# Patient Record
Sex: MALE | Race: WHITE | Employment: FULL TIME | URBAN - METROPOLITAN AREA
[De-identification: names, ages, dates, MRNs, and addresses within clinical notes are randomized per-mention and may not be internally consistent; named-entity substitution may affect disease eponyms.]

---

## 2022-01-02 ENCOUNTER — HOSPITAL ENCOUNTER (EMERGENCY)
Age: 22
Discharge: ANOTHER HOSPITAL | End: 2022-01-03
Attending: EMERGENCY MEDICINE
Payer: COMMERCIAL

## 2022-01-02 DIAGNOSIS — F10.20 ALCOHOLISM SYNDROME (HCC): ICD-10-CM

## 2022-01-02 DIAGNOSIS — F32.A DEPRESSION WITH SUICIDAL IDEATION: Primary | ICD-10-CM

## 2022-01-02 DIAGNOSIS — F10.920 ACUTE ALCOHOLIC INTOXICATION WITHOUT COMPLICATION (HCC): ICD-10-CM

## 2022-01-02 DIAGNOSIS — R45.851 DEPRESSION WITH SUICIDAL IDEATION: Primary | ICD-10-CM

## 2022-01-02 LAB
A/G RATIO: 1.5 (ref 1.1–2.2)
ACETAMINOPHEN LEVEL: <5 UG/ML (ref 10–30)
ALBUMIN SERPL-MCNC: 5.1 G/DL (ref 3.4–5)
ALP BLD-CCNC: 116 U/L (ref 40–129)
ALT SERPL-CCNC: 30 U/L (ref 10–40)
ANION GAP SERPL CALCULATED.3IONS-SCNC: 17 MMOL/L (ref 3–16)
AST SERPL-CCNC: 23 U/L (ref 15–37)
BASOPHILS ABSOLUTE: 0.1 K/UL (ref 0–0.2)
BASOPHILS RELATIVE PERCENT: 0.8 %
BILIRUB SERPL-MCNC: 0.4 MG/DL (ref 0–1)
BUN BLDV-MCNC: 6 MG/DL (ref 7–20)
CALCIUM SERPL-MCNC: 9.7 MG/DL (ref 8.3–10.6)
CHLORIDE BLD-SCNC: 104 MMOL/L (ref 99–110)
CO2: 20 MMOL/L (ref 21–32)
CREAT SERPL-MCNC: 0.8 MG/DL (ref 0.9–1.3)
EOSINOPHILS ABSOLUTE: 0.1 K/UL (ref 0–0.6)
EOSINOPHILS RELATIVE PERCENT: 1.1 %
ETHANOL: 203 MG/DL (ref 0–0.08)
GFR AFRICAN AMERICAN: >60
GFR NON-AFRICAN AMERICAN: >60
GLUCOSE BLD-MCNC: 107 MG/DL (ref 70–99)
HCT VFR BLD CALC: 54.3 % (ref 40.5–52.5)
HEMOGLOBIN: 18.8 G/DL (ref 13.5–17.5)
LYMPHOCYTES ABSOLUTE: 1.7 K/UL (ref 1–5.1)
LYMPHOCYTES RELATIVE PERCENT: 20.9 %
MCH RBC QN AUTO: 30.6 PG (ref 26–34)
MCHC RBC AUTO-ENTMCNC: 34.7 G/DL (ref 31–36)
MCV RBC AUTO: 88.3 FL (ref 80–100)
MONOCYTES ABSOLUTE: 0.4 K/UL (ref 0–1.3)
MONOCYTES RELATIVE PERCENT: 4.8 %
NEUTROPHILS ABSOLUTE: 5.8 K/UL (ref 1.7–7.7)
NEUTROPHILS RELATIVE PERCENT: 72.4 %
PDW BLD-RTO: 13.3 % (ref 12.4–15.4)
PLATELET # BLD: 402 K/UL (ref 135–450)
PMV BLD AUTO: 7.4 FL (ref 5–10.5)
POTASSIUM REFLEX MAGNESIUM: 3.6 MMOL/L (ref 3.5–5.1)
RBC # BLD: 6.15 M/UL (ref 4.2–5.9)
SALICYLATE, SERUM: <0.3 MG/DL (ref 15–30)
SODIUM BLD-SCNC: 141 MMOL/L (ref 136–145)
TOTAL PROTEIN: 8.4 G/DL (ref 6.4–8.2)
WBC # BLD: 8 K/UL (ref 4–11)

## 2022-01-02 PROCEDURE — 80179 DRUG ASSAY SALICYLATE: CPT

## 2022-01-02 PROCEDURE — 80053 COMPREHEN METABOLIC PANEL: CPT

## 2022-01-02 PROCEDURE — 36415 COLL VENOUS BLD VENIPUNCTURE: CPT

## 2022-01-02 PROCEDURE — 99285 EMERGENCY DEPT VISIT HI MDM: CPT

## 2022-01-02 PROCEDURE — 85025 COMPLETE CBC W/AUTO DIFF WBC: CPT

## 2022-01-02 PROCEDURE — 82077 ASSAY SPEC XCP UR&BREATH IA: CPT

## 2022-01-02 PROCEDURE — 80143 DRUG ASSAY ACETAMINOPHEN: CPT

## 2022-01-03 ENCOUNTER — HOSPITAL ENCOUNTER (INPATIENT)
Age: 22
LOS: 5 days | Discharge: HOME | DRG: 885 | End: 2022-01-08
Attending: PSYCHIATRY & NEUROLOGY | Admitting: PSYCHIATRY & NEUROLOGY
Payer: COMMERCIAL

## 2022-01-03 VITALS
RESPIRATION RATE: 18 BRPM | WEIGHT: 159 LBS | DIASTOLIC BLOOD PRESSURE: 91 MMHG | OXYGEN SATURATION: 95 % | HEART RATE: 115 BPM | SYSTOLIC BLOOD PRESSURE: 137 MMHG

## 2022-01-03 PROBLEM — R45.851 DEPRESSION WITH SUICIDAL IDEATION: Status: ACTIVE | Noted: 2022-01-03

## 2022-01-03 PROBLEM — F32.A DEPRESSION WITH SUICIDAL IDEATION: Status: ACTIVE | Noted: 2022-01-03

## 2022-01-03 LAB
AMPHETAMINE SCREEN, URINE: ABNORMAL
BARBITURATE SCREEN URINE: ABNORMAL
BENZODIAZEPINE SCREEN, URINE: ABNORMAL
CANNABINOID SCREEN URINE: POSITIVE
COCAINE METABOLITE SCREEN URINE: ABNORMAL
ETHANOL: 77 MG/DL (ref 0–0.08)
Lab: ABNORMAL
METHADONE SCREEN, URINE: ABNORMAL
OPIATE SCREEN URINE: ABNORMAL
OXYCODONE URINE: ABNORMAL
PH UA: 5
PHENCYCLIDINE SCREEN URINE: ABNORMAL
PROPOXYPHENE SCREEN: ABNORMAL
SARS-COV-2, NAAT: NOT DETECTED
TSH SERPL DL<=0.05 MIU/L-ACNC: 2.24 UIU/ML (ref 0.43–4)

## 2022-01-03 PROCEDURE — 83036 HEMOGLOBIN GLYCOSYLATED A1C: CPT

## 2022-01-03 PROCEDURE — 87635 SARS-COV-2 COVID-19 AMP PRB: CPT

## 2022-01-03 PROCEDURE — 84443 ASSAY THYROID STIM HORMONE: CPT

## 2022-01-03 PROCEDURE — 36415 COLL VENOUS BLD VENIPUNCTURE: CPT

## 2022-01-03 PROCEDURE — 80307 DRUG TEST PRSMV CHEM ANLYZR: CPT

## 2022-01-03 PROCEDURE — 1240000000 HC EMOTIONAL WELLNESS R&B

## 2022-01-03 PROCEDURE — 6370000000 HC RX 637 (ALT 250 FOR IP): Performed by: EMERGENCY MEDICINE

## 2022-01-03 PROCEDURE — 6370000000 HC RX 637 (ALT 250 FOR IP)

## 2022-01-03 PROCEDURE — 80061 LIPID PANEL: CPT

## 2022-01-03 PROCEDURE — 82077 ASSAY SPEC XCP UR&BREATH IA: CPT

## 2022-01-03 RX ORDER — LANOLIN ALCOHOL/MO/W.PET/CERES
6 CREAM (GRAM) TOPICAL NIGHTLY PRN
Status: DISCONTINUED | OUTPATIENT
Start: 2022-01-03 | End: 2022-01-03

## 2022-01-03 RX ORDER — ACETAMINOPHEN 325 MG/1
650 TABLET ORAL EVERY 4 HOURS PRN
Status: DISCONTINUED | OUTPATIENT
Start: 2022-01-03 | End: 2022-01-04

## 2022-01-03 RX ORDER — OLANZAPINE 10 MG/1
10 INJECTION, POWDER, LYOPHILIZED, FOR SOLUTION INTRAMUSCULAR EVERY 8 HOURS PRN
Status: CANCELLED | OUTPATIENT
Start: 2022-01-03

## 2022-01-03 RX ORDER — IBUPROFEN 400 MG/1
400 TABLET ORAL EVERY 6 HOURS PRN
Status: CANCELLED | OUTPATIENT
Start: 2022-01-03

## 2022-01-03 RX ORDER — LORAZEPAM 1 MG/1
1 TABLET ORAL EVERY 4 HOURS PRN
Status: DISCONTINUED | OUTPATIENT
Start: 2022-01-03 | End: 2022-01-03 | Stop reason: HOSPADM

## 2022-01-03 RX ORDER — DIPHENHYDRAMINE HYDROCHLORIDE 50 MG/ML
50 INJECTION INTRAMUSCULAR; INTRAVENOUS EVERY 4 HOURS PRN
Status: CANCELLED | OUTPATIENT
Start: 2022-01-03

## 2022-01-03 RX ORDER — POLYETHYLENE GLYCOL 3350 17 G
2 POWDER IN PACKET (EA) ORAL
Status: CANCELLED | OUTPATIENT
Start: 2022-01-03

## 2022-01-03 RX ORDER — ACETAMINOPHEN 325 MG/1
650 TABLET ORAL EVERY 4 HOURS PRN
Status: CANCELLED | OUTPATIENT
Start: 2022-01-03

## 2022-01-03 RX ORDER — OLANZAPINE 5 MG/1
10 TABLET ORAL EVERY 8 HOURS PRN
Status: CANCELLED | OUTPATIENT
Start: 2022-01-03

## 2022-01-03 RX ORDER — MAGNESIUM HYDROXIDE/ALUMINUM HYDROXICE/SIMETHICONE 120; 1200; 1200 MG/30ML; MG/30ML; MG/30ML
30 SUSPENSION ORAL EVERY 6 HOURS PRN
Status: DISCONTINUED | OUTPATIENT
Start: 2022-01-03 | End: 2022-01-08 | Stop reason: HOSPADM

## 2022-01-03 RX ORDER — ONDANSETRON 4 MG/1
4 TABLET, ORALLY DISINTEGRATING ORAL ONCE
Status: COMPLETED | OUTPATIENT
Start: 2022-01-03 | End: 2022-01-03

## 2022-01-03 RX ORDER — IBUPROFEN 400 MG/1
400 TABLET ORAL EVERY 6 HOURS PRN
Status: DISCONTINUED | OUTPATIENT
Start: 2022-01-03 | End: 2022-01-04

## 2022-01-03 RX ORDER — DIPHENHYDRAMINE HYDROCHLORIDE 50 MG/ML
50 INJECTION INTRAMUSCULAR; INTRAVENOUS EVERY 4 HOURS PRN
Status: DISCONTINUED | OUTPATIENT
Start: 2022-01-03 | End: 2022-01-04

## 2022-01-03 RX ORDER — HYDROXYZINE HYDROCHLORIDE 25 MG/1
50 TABLET, FILM COATED ORAL 3 TIMES DAILY PRN
Status: CANCELLED | OUTPATIENT
Start: 2022-01-03

## 2022-01-03 RX ORDER — OLANZAPINE 10 MG/1
10 TABLET ORAL EVERY 8 HOURS PRN
Status: DISCONTINUED | OUTPATIENT
Start: 2022-01-03 | End: 2022-01-04

## 2022-01-03 RX ORDER — OLANZAPINE 10 MG/1
10 INJECTION, POWDER, LYOPHILIZED, FOR SOLUTION INTRAMUSCULAR EVERY 8 HOURS PRN
Status: DISCONTINUED | OUTPATIENT
Start: 2022-01-03 | End: 2022-01-04

## 2022-01-03 RX ORDER — HYDROXYZINE HYDROCHLORIDE 10 MG/1
50 TABLET, FILM COATED ORAL 3 TIMES DAILY PRN
Status: DISCONTINUED | OUTPATIENT
Start: 2022-01-03 | End: 2022-01-06 | Stop reason: SDUPTHER

## 2022-01-03 RX ORDER — MAGNESIUM HYDROXIDE/ALUMINUM HYDROXICE/SIMETHICONE 120; 1200; 1200 MG/30ML; MG/30ML; MG/30ML
30 SUSPENSION ORAL EVERY 6 HOURS PRN
Status: CANCELLED | OUTPATIENT
Start: 2022-01-03

## 2022-01-03 RX ORDER — TRAZODONE HYDROCHLORIDE 50 MG/1
50 TABLET ORAL NIGHTLY PRN
Status: DISCONTINUED | OUTPATIENT
Start: 2022-01-03 | End: 2022-01-08 | Stop reason: HOSPADM

## 2022-01-03 RX ORDER — TRAZODONE HYDROCHLORIDE 50 MG/1
50 TABLET ORAL NIGHTLY PRN
Status: CANCELLED | OUTPATIENT
Start: 2022-01-03

## 2022-01-03 RX ORDER — POLYETHYLENE GLYCOL 3350 17 G
2 POWDER IN PACKET (EA) ORAL
Status: DISCONTINUED | OUTPATIENT
Start: 2022-01-03 | End: 2022-01-08 | Stop reason: HOSPADM

## 2022-01-03 RX ADMIN — ONDANSETRON 4 MG: 4 TABLET, ORALLY DISINTEGRATING ORAL at 04:32

## 2022-01-03 RX ADMIN — LORAZEPAM 1 MG: 1 TABLET ORAL at 05:00

## 2022-01-03 RX ADMIN — HYDROXYZINE HYDROCHLORIDE 50 MG: 10 TABLET, FILM COATED ORAL at 20:11

## 2022-01-03 RX ADMIN — LIDOCAINE HYDROCHLORIDE: 20 SOLUTION ORAL; TOPICAL at 05:48

## 2022-01-03 RX ADMIN — MELATONIN TAB 3 MG 6 MG: 3 TAB at 02:34

## 2022-01-03 RX ADMIN — TRAZODONE HYDROCHLORIDE 50 MG: 50 TABLET ORAL at 20:11

## 2022-01-03 RX ADMIN — HYDROXYZINE HYDROCHLORIDE 50 MG: 10 TABLET, FILM COATED ORAL at 11:01

## 2022-01-03 ASSESSMENT — SLEEP AND FATIGUE QUESTIONNAIRES
DIFFICULTY STAYING ASLEEP: YES
AVERAGE NUMBER OF SLEEP HOURS: 8
RESTFUL SLEEP: YES
SLEEP PATTERN: DIFFICULTY FALLING ASLEEP;EARLY AWAKENING;RESTLESSNESS
DO YOU HAVE DIFFICULTY SLEEPING: YES
DIFFICULTY ARISING: NO
DIFFICULTY FALLING ASLEEP: YES
DO YOU USE A SLEEP AID: YES

## 2022-01-03 ASSESSMENT — PAIN SCALES - GENERAL: PAINLEVEL_OUTOF10: 0

## 2022-01-03 ASSESSMENT — PATIENT HEALTH QUESTIONNAIRE - PHQ9: SUM OF ALL RESPONSES TO PHQ QUESTIONS 1-9: 20

## 2022-01-03 ASSESSMENT — LIFESTYLE VARIABLES: HISTORY_ALCOHOL_USE: YES

## 2022-01-03 NOTE — ED PROVIDER NOTES
[address] [facility]  eMERGENCY dEPARTMENT eNCOUnter        Pt Name: Pearl Wagoner MRN: [id number]  Birthdate 2000  Date of evaluation: 1/2/2022  Provider: Felicitas Sim MD  PCP: Angela Stewart DO      CHIEF COMPLAINT       Chief Complaint   Patient presents with    Suicidal     pt states having suicidal thoughts and thoughts of wanting to harm himself that started yesterday. HISTORY OFPRESENT ILLNESS   (Location/Symptom, Timing/Onset, Context/Setting, Quality, Duration, Modifying Factors,Severity)  Note limiting factors. Pearl Wagoner is a 25 y.o. male brought in by mother patient states he is feeling suicidal told me he had no plan he told his mother he plan to kill himself by using a gun from a friend he has cut on himself in the past and was admitted for an overdose several years ago he refuses counseling or medications in the past mother states he has been drinking on a regular basis and has been suicidal over the weekend patient states he will harm himself if he goes home    Nursing Notes were all reviewed and agreed with or any disagreements were addressed  in the HPI. REVIEW OF SYSTEMS    (2-9 systems for level 4, 10 or more for level 5)       REVIEW OF SYSTEMS    Constitutional:  Denies fever, chills, or weakness   Eyes:  Denies vision changes  HENT:  Denies sore throat or neck pain   Respiratory:  Denies cough or shortness of breath   Cardiovascular:  Denies chest pain  GI:  Denies abdominal pain, nausea, vomiting, or diarrhea   Musculoskeletal:  Denies back pain   Skin: no rash or vesicles   Neurologic:  no headache weakness focal    Lymphatic:  no swollen  nodes   Psychiatric: no si or hs thoughts     All systems negative except as marked. Positives and Pertinent negatives as per HPI. Except as noted above in the ROS, all other systems were reviewed andnegative.        PASTMEDICAL HISTORY     Past Medical History:   Diagnosis Date  OCD (obsessive compulsive disorder)          SURGICAL HISTORY       Past Surgical History:   Procedure Laterality Date    TONSILLECTOMY AND ADENOIDECTOMY  2009         CURRENT MEDICATIONS       Previous Medications    EPINEPHRINE (EPIPEN 2-MOY) 0.3 MG/0.3ML SOAJ INJECTION    Inject 0.3 mLs into the muscle once for 1 dose Use as directed for allergic reaction    SERTRALINE (ZOLOFT) 100 MG TABLET    Take 75 mg by mouth daily. ALLERGIES     Patient has no known allergies. FAMILY HISTORY     History reviewed. No pertinent family history. SOCIAL HISTORY       Social History     Socioeconomic History    Marital status: Single     Spouse name: None    Number of children: None    Years of education: None    Highest education level: None   Occupational History    None   Tobacco Use    Smoking status: Never Smoker    Smokeless tobacco: Never Used   Substance and Sexual Activity    Alcohol use: No    Drug use: No    Sexual activity: None   Other Topics Concern    None   Social History Narrative    None     Social Determinants of Health     Financial Resource Strain:     Difficulty of Paying Living Expenses: Not on file   Food Insecurity:     Worried About Running Out of Food in the Last Year: Not on file    Colin of Food in the Last Year: Not on file   Transportation Needs:     Lack of Transportation (Medical): Not on file    Lack of Transportation (Non-Medical):  Not on file   Physical Activity:     Days of Exercise per Week: Not on file    Minutes of Exercise per Session: Not on file   Stress:     Feeling of Stress : Not on file   Social Connections:     Frequency of Communication with Friends and Family: Not on file    Frequency of Social Gatherings with Friends and Family: Not on file    Attends Buddhist Services: Not on file    Active Member of Clubs or Organizations: Not on file    Attends Club or Organization Meetings: Not on file    Marital Status: Not on file   Intimate Partner Violence:     Fear of Current or Ex-Partner: Not on file    Emotionally Abused: Not on file    Physically Abused: Not on file    Sexually Abused: Not on file   Housing Stability:     Unable to Pay for Housing in the Last Year: Not on file    Number of [place] in the Last Year: Not on file    Unstable Housing in the Last Year: Not on file       SCREENINGS             PHYSICAL EXAM    (up to 7 for level 4, 8 or more for level 5)     ED Triage Vitals [01/02/22 2258]   BP Temp Temp src Heart Rate Resp SpO2 Height Weight   (!) 158/89 -- -- (!) 124 18 95 % -- 159 lb (72.1 kg)           General Appearance:  Alert, cooperative, no distress, appears stated age. Head:  Normocephalic, without obvious abnormality, atraumatic. Eyes:  conjunctiva/corneas clear, EOM's intact. Sclera anicteric. ENT: Mucous membranes moist.   Neck: Supple, symmetrical, trachea midline, no adenopathy. No jugular venous distention. Lungs:   No Respiratory Distress. no rales  rhonchi rub   Chest Wall:  Nontender  no deformity   Heart:  Rsr no murmer gallop    Abdomen:   Soft nontender no organomegally    Extremities:  Full range of motion. no deformity   Pulses: Equal  upper and lower    Skin:  No rashes or lesions to exposed skin. Neurologic: Alert and oriented X 3. Motor grossly normal.  Speech clear.  Cr n 2-12 intact   Mental status patient is depressed suicidal does not express a specific plan told his mother he would get a gun he has knives at home and has cut on himself before judgment impaired suicidal thoughts no homicidal thoughts    DIAGNOSTIC RESULTS   LABS:    Labs Reviewed   CBC WITH AUTO DIFFERENTIAL - Abnormal; Notable for the following components:       Result Value    RBC 6.15 (*)     Hemoglobin 18.8 (*)     Hematocrit 54.3 (*)     All other components within normal limits    Narrative:     Performed at:  [facility] [address], [place], [address]   Phone [phone]   COMPREHENSIVE METABOLIC PANEL W/ REFLEX TO MG FOR LOW K - Abnormal; Notable for the following components:    CO2 20 (*)     Anion Gap 17 (*)     Glucose 107 (*)     BUN 6 (*)     CREATININE 0.8 (*)     Total Protein 8.4 (*)     Albumin 5.1 (*)     All other components within normal limits    Narrative:     Performed at:  [facility] [address], [facility], [address]   Phone [phone]   [address] - Abnormal; Notable for the following components:    Cannabinoid Scrn, Ur POSITIVE (*)     All other components within normal limits    Narrative:     Performed at:  [facility] [address], [facility], [facility]   Phone [phone]   SALICYLATE LEVEL - Abnormal; Notable for the following components:    Salicylate, Serum <6.6 (*)     All other components within normal limits    Narrative:     Performed at:  [facility] [address], [facility], [facility]   Phone [phone]   ACETAMINOPHEN LEVEL - Abnormal; Notable for the following components:    Acetaminophen Level <5 (*)     All other components within normal limits    Narrative:     Performed at:  [facility] [address], [facility], [address]   Phone [phone], RAPID   ETHANOL    Narrative:     Performed at:  [facility] [address], [facility], [address]   Phone [phone]       All other labs were within normal range or not returned as of thisdictation. EKG:  All EKG's are interpreted by the Emergency Department Physician who either signs or Co-signs this chart in the absence of a cardiologist.        RADIOLOGY:   Non-plain film images such as CT, Ultrasound and MRI are read by the radiologist. Plainradiographic images are visualized and preliminarily interpreted by the  ED Provider with the belowfindings:        Interpretation per the Radiologist below, if available at the time of this note:    No orders to display         PROCEDURES   Unless otherwise noted below, none     Procedures    CRITICAL CARE TIME   N/A      CONSULTS:  None    EMERGENCY DEPARTMENT COURSE and DIFFERENTIAL DIAGNOSIS/MDM:   Vitals:    Vitals:    01/02/22 2258   BP: (!) 158/89   Pulse: (!) 124   Resp: 18   SpO2: 95%   Weight: 159 lb (72.1 kg)       Patient was given the following medications:  Medications   melatonin tablet 6 mg (has no administration in time range)     Patient is medically cleared for transfer to behavioral health unit    The patient tolerated their visit well. Thepatient and / or the family were informed of the results of any tests, a time was given to answer questions. FINAL IMPRESSION      1. Depression with suicidal ideation    2. Acute alcoholic intoxication without complication ([facility] [address]    3. Alcoholism syndrome [facility])        DISPOSITION/PLAN   DISPOSITION Decision To Transfer 01/03/2022 02:17:26 AM      PATIENT REFERRED TO:  No follow-up provider specified.     DISCHARGE MEDICATIONS:  New Prescriptions    No medications on file       DISCONTINUED MEDICATIONS:  Discontinued Medications    No medications on file              (Please note that portions of this note were completed with a voice recognition program.  Efforts were made to edit the dictations but occasionally words aremis-transcribed.)    Dawit Tanner MD (electronically signed)          Dawit Tanner MD  01/03/22 9937

## 2022-01-03 NOTE — FLOWSHEET NOTE
01/03/22 1442   Psychiatric History   Psychiatric history treatment Psychiatric admissions  [facility])   Are there any medication issues? No   Support System   Support system Adequate   Types of Support System Friend; Mother   Current Living Situation   Home Living Adequate   Living information Lives with others  (Lives with mom and her fiance.)   Problems with living situation  No   Lack of basic needs No   SSDI/SSI None   Other government assistance None   Problems with environment None   Current abuse issues None   Relationship problems No   Medical and Self-Care Issues   Relevant medical problems None   Relevant self-care issues None   Barriers to treatment No   Family Constellation   Spouse/partner-name/age N/A   Children-names/ages None   Parents Rodolfo Roa [phone]   Siblings Has a lot of siblings but cannot remember how many   Support services   (None)   Childhood   Raised by Biological mother   Biological mother Rodolfo Roa [phone]   Relevant family history Unknown   History of abuse No   Legal History   Legal history No   Juvenile legal history No    Abuse Assessment   Physical Abuse Denies   Verbal Abuse Denies   Emotional abuse Denies   Financial Abuse Denies   Sexual abuse Denies   Substance Use   Use of substances  Yes   Substance 1   Substance used Alcohol   Amount/frequency/route Usually drinks as much as he can until he blacks out. Age of first use 14   Last use/History New Year's Gwendolyn   Substance 2   Substance used Amphetamine   Amount/frequency/route Orally. Use to use it a lot but not anymore. Adderral   Age of first use 14   Last use/History Cannot remember   Substance 3   Substance used Benzodiazepines   Amount/frequency/route Snorted or orally. (Lorazepam) Does not take this anymore.    Age of first use 18   Last use/History Cannot remember   Substance 4   Substance used Inhalants   Amount/frequency/route Nitrous Oxide, Huffing   Age of first use 27   Last use/History Last used a few months ago. Substance 5   Substance used Marijuana   Amount/frequency/route Smoke   Age of first use 15   Last use/History A week ago   Substance 6   Substance used Over-the-counter medications   Amount/frequency/route DXM, Benzadrex   Age of first use 14   Last use/History A few months ago. Prescription drugs (Lorazepam, Vyvanse, Atterrall, Phentermine). Ambien. Motivation for SA Treatment   Stage of engagement Pre-engagement/engagement  (Does not want to use \"as much\")   Motivation for treatment Yes  (Some.)   Current barriers to treatment Financial/insurance; Other   Comment Does not want to cost him mother a lot of money. Education   Education [employer] graduate -GED   Special education Other  (Autism diagnosis, IEP or [phone] likely. He received shorter test times and other help.)   Work History   Currently employed Yes  ([employer])   /VA involvement None   Leisure/Activity   Present interests Talking with friends, video games, watching [employer]   Current daily activity Get up, go to work, go hme, drink, play video games, hop on [address] with friends/family No   Cultural and Spiritual   Spiritual concerns No   Cultural concerns No     Clinician met with patient to complete a psychosocial assessment, leisure assessment and CSSRS assessment. Patient was cooperative throughout assessment. Patient admits his main problem is drinking and that he drinks almost daily. He reports he blacks out after drinking and breaks things and he said he wants to \"cut down. \" Patient has an extensive drug history and report he uses marijuana now. Patient reports a long history of alcoholism on his father's side of the family and states that his father passed from alcoholism. Patient denies any trauma. Patient states he was diagnosed with Autism in school and he said he wants to learn social skills and cut down on drinking.      32 Jose Kinney, HENRY

## 2022-01-03 NOTE — BH NOTE
Pt pacing on unit. States he is very anxious milieu is very active at this time. Pt sat in [employer] listening to music briefly then came back out to [facility] to sit at table. RN offered medication but pt states it makes him too tired. 15 minute checks continued.

## 2022-01-03 NOTE — BH NOTE
Patient is pacing and is visibly anxious and irritable. He states he is tired but feels like he is unable to sleep and associates this with the Ativan he was given prior to transport. He was given Hydroxyzine per order, see MAR.

## 2022-01-03 NOTE — ED NOTES
Report called to [facility] at [facility]. PT transported on stretcher by [facility] to [facility] with all belongings.      Tory Lanes, RN  01/03/22 5159

## 2022-01-04 PROBLEM — F10.20 ALCOHOL USE DISORDER, MODERATE, IN CONTROLLED ENVIRONMENT (HCC): Status: ACTIVE | Noted: 2022-01-04

## 2022-01-04 PROBLEM — F33.2 MAJOR DEPRESSIVE DISORDER, RECURRENT SEVERE WITHOUT PSYCHOTIC FEATURES (HCC): Status: ACTIVE | Noted: 2022-01-03

## 2022-01-04 LAB
CHOLESTEROL, TOTAL: 191 MG/DL (ref 0–199)
HDLC SERPL-MCNC: 58 MG/DL (ref 40–60)
LDL CHOLESTEROL CALCULATED: 120 MG/DL
TRIGL SERPL-MCNC: 67 MG/DL (ref 0–150)
VLDLC SERPL CALC-MCNC: 13 MG/DL

## 2022-01-04 PROCEDURE — 1240000000 HC EMOTIONAL WELLNESS R&B

## 2022-01-04 PROCEDURE — 99221 1ST HOSP IP/OBS SF/LOW 40: CPT

## 2022-01-04 PROCEDURE — 6370000000 HC RX 637 (ALT 250 FOR IP)

## 2022-01-04 PROCEDURE — 99223 1ST HOSP IP/OBS HIGH 75: CPT | Performed by: PSYCHIATRY & NEUROLOGY

## 2022-01-04 RX ORDER — ACETAMINOPHEN 325 MG/1
650 TABLET ORAL EVERY 4 HOURS PRN
Status: DISCONTINUED | OUTPATIENT
Start: 2022-01-04 | End: 2022-01-08 | Stop reason: HOSPADM

## 2022-01-04 RX ADMIN — TRAZODONE HYDROCHLORIDE 50 MG: 50 TABLET ORAL at 22:17

## 2022-01-04 RX ADMIN — HYDROXYZINE HYDROCHLORIDE 50 MG: 10 TABLET, FILM COATED ORAL at 22:18

## 2022-01-04 ASSESSMENT — PAIN SCALES - GENERAL: PAINLEVEL_OUTOF10: 0

## 2022-01-04 NOTE — PLAN OF CARE
Problem: Altered Mood, Depressive Behavior:  Goal: Ability to disclose and discuss suicidal ideas will improve  Description: Ability to disclose and discuss suicidal ideas will improve  Outcome: Ongoing     Problem: Suicide risk  Goal: Provide patient with safe environment  Description: Provide patient with safe environment  Outcome: Ongoing   Pt A&Ox4, visible but isolative to self on unit. Denies desire to harm others and contracts for safety while on unit and feels safe at this time. Denies a/v hallucinations. Pt has been pacing on unit, compliant with care, completes ADLs. 15 minute checks continued for safety.

## 2022-01-04 NOTE — GROUP NOTE
Group Therapy Note    Date: 1/4/2022    Group Start Time: 1000  Group End Time: 6990  Group Topic: Recreational    [id number] [facility] [employer]        Group Therapy Note    Attendees: 10    Group members broke into teams and engaged in game of [employer]. Notes:  Kristin Mar joined group at the end due to meeting with his doctor. Kristin Mar interacted appropriately and remained engaged. Status After Intervention:  Improved    Participation Level:  Active Listener    Participation Quality: Appropriate and Attentive      Speech:  normal      Thought Process/Content: Logical      Affective Functioning: Congruent      Mood: euthymic      Level of consciousness:  Alert      Response to Learning: Able to verbalize current knowledge/experience      Endings: None Reported    Modes of Intervention: Activity      Discipline Responsible: Behavorial Health Tech      Signature:  LISA Calderón

## 2022-01-04 NOTE — GROUP NOTE
Group Therapy Note    Date: 1/4/2022    Group Start Time: 1100  Group End Time: 9734  Group Topic: Psychoeducation    [address] [facility], [address]        Group Therapy Note    Attendees: 11    Participants learned about effective communication skills and practiced scenarios and responses using Passive, Assertive and Aggressive styles. Notes:  Ezra Romano attended group and was engaged but quiet throughout group. Status After Intervention:  Improved    Participation Level:  Active Listener    Participation Quality: Resistant      Speech:  mute      Thought Process/Content: Logical      Affective Functioning: Congruent      Mood: euthymic      Level of consciousness:  Alert and Attentive      Response to Learning: Progressing to goal      Endings: None Reported    Modes of Intervention: Education and Activity      Discipline Responsible: /Counselor      Signature:  HENRY Erazo

## 2022-01-04 NOTE — CARE COORDINATION
[facility]  Treatment Team Note  Day 1    Review Date & Time: 0900  1/4/2021    Patient was not in treatment team      Status EXAM:   Status and Exam  Normal: No  Facial Expression: Avoids Gaze,Flat  Affect: Constricted  Level of Consciousness: Alert  Mood:Normal: No  Mood: Anxious  Motor Activity:Normal: No  Motor Activity: Increased,Other(See Comments) (Pacing)  Interview Behavior: Cooperative  Preception: [place] to Person,[place] to Time,[place] to Place,[place] to Situation  Attention:Normal: Yes  Thought Content:Normal: Yes  Hallucinations: None  Delusions: No  Memory:Normal: No  Memory: Poor Recent  Insight and Judgment: No  Insight and Judgment: Poor Judgment,Poor Insight  Present Suicidal Ideation: No  Present Homicidal Ideation: No      Suicide Risk CSSR-S:  1) Within the past month, have you wished you were dead or wished you could go to sleep and not wake up? : Yes  2) Have you actually had any thoughts of killing yourself? : Yes  3) Have you been thinking about how you might kill yourself? : Yes (previously)  5) Have you started to work out or worked out the details of how to kill yourself? Do you intend to carry out this plan? : Yes  6) Have you ever done anything, started to do anything, or prepared to do anything to end your life?: No      PLAN/TREATMENT RECOMMENDATIONS UPDATE: Patient will take medication as prescribed, eat 75% of meals, attend groups, participate in milieu activities, participate in treatment team and care planning for discharge and follow up.         Jeanne Harper RN

## 2022-01-04 NOTE — PLAN OF CARE
Nette Cain was very anxious and pacing the unit at the start of the shift. Nette Cain refused to have vital signs taken. Denies SI/HI. He contracted for safety and stated he wouldn't harm himself while in the hospital. Suicide precautions discontinued by MD. Sheri Habermann guarded at times. He stated he hadn't had a \"drink\" in 2 days and stated \"I am over the hump. \" He stated he would seek out staff if he had any withdraw symptoms. None reported or observed. He stated he was tired but was still pacing the unit. Hydroxyzine and Trazodone given PRN which helped. He is currently sleeping.

## 2022-01-04 NOTE — H&P
[address]                 [address] [address]                              HISTORY AND PHYSICAL    PATIENT NAME: Leo Montes                    :        2000  MED REC NO:   [id number]                          ROOM:       2320  ACCOUNT NO:   [de-identified]                           ADMIT DATE: 2022  PROVIDER:     Ludmila Ramsey MD    IDENTIFICATION:  This is a domiciled, never , employed  57-year-old with a self-reported history of depression, anxiety, OCD,  autism, and alcohol use who's mother brought him to [facility]  Emergency Department because of increasing depression and thoughts of  suicide. SOURCES OF INFORMATION:  The patient. Focused record review. CHIEF COMPLAINT:  \"I drank a lot and started breaking things and checked  myself in.\"    HISTORY OF PRESENT ILLNESS:  The patient reports over the last couple of  months he has developed worsening symptoms of depression including low  mood, anhedonia, fatigue, decreased concentration, excessive guilt,  self-reproach, and increasing thoughts of suicide. He says he has had  suicide impulses off and on for a couple of weeks, but it became more intense  recently. He began thinking of overdosing or shooting himself with a  gun. Because of this, his mother brought him to [facility] for assessment. PSYCHIATRIC REVIEW OF SYSTEMS:  No meliza or psychosis. STRESSORS:  work. PAST PSYCHIATRIC HISTORY:  Hospitalized at [facility] and [address] [place] [address] because of suicidal ideation. He was [facility] in [facility] - followed there for about 6 years. He  reports being diagnosed with depression, anxiety, OCD, and autism. He  has attempted suicide at least once the last 2 years ago by overdose. He  believes he was hospitalized at the [facility].   He has had multiple  medication trials including most SSRIs and \"some antipsychotics. \"  He  reports being trialed on Zoloft, Prozac, Lexapro, Celexa, Wellbutrin,  Abilify, and Seroquel. SUBSTANCE USE HISTORY:  THC and alcohol. He uses THC occasionally, but  when he does use that he binges on it. He says he uses alcohol nearly  daily. He has never been through withdrawal.  He reports completing a  program called [facility] and being involved in [place] [address]. MEDICAL HISTORY:  No chronic conditions, traumatic brain injuries, or  seizures. He had a tonsillectomy, but no other surgeries. FAMILY PSYCHIATRIC HISTORY:  \"Everyone - depression and addiction. \"  He  says his father had severe alcohol use disorder. No suicides. CURRENT MEDICATIONS:  None. ALLERGIES:  No known drug allergies. SOCIAL HISTORY:  Born in [address] [place] [address] Dr. Mcmillan says he does not remember how many  siblings he actually has, but believes it maybe four to five. His  parents are . Growing up was [employer]. \"  His biological  father  of an MI when the patient was 8years of age. He was raised  mostly by mother. He graduated high school and has worked full-time at  "[employer] since. He has never been . He has no kids. He lives  with his mom and her fiance in [place]. LEGAL HISTORY:  None recent. REVIEW OF SYSTEMS:  He is not vaccinated for COVID and is not  interested. He reported having bronchitis 1 to 2 weeks ago and tested  negative for flu and COVID then. He did not describe or endorse recent  changes in vision, chest pain, abdominal pain, neurological problems,  bleeding problems, or skin problems. He was moving all four extremities  and speaking without difficulty. MENTAL STATUS EXAMINATION:  He presented in hospital gown. He was  guarded, but opened with conversation. He made fair eye contact. He  described his mood as \"depressed\" and had a congruent affect. He had  moderate psychomotor retardation. He had some odd interpersonal  relatedness.     He spoke softly. He was not pressured. He was oriented to the date,  day, place, and context of this evaluation. His memory was intact. His use of language, speech, and educational attainment suggested an  average level of intellectual functioning. His thought processes were organized and goal-directed. He did not  describe or endorse hallucinations, delusions, or homicidal thinking. He did endorse suicidal thinking, but reported feeling safe here and  willing to approach staff with concerns. His ability for abstract thought was fair based on his interpretation of  simple proverbs. Insight and judgment were impaired. PHYSICAL EXAMINATION:  VITAL SIGNS:  Temperature 98.4, pulse 79, respiratory rate 16, blood  pressure 132/78. NEUROLOGIC:  Gait normal.    LABORATORY DATA:  Shows a CO2 at less than 20, BUN less than 6,  creatinine less than 0.8, glucose at 107, protein at 8.4, albumin at  5.1, otherwise within normal limits. TSH within normal limits. Alcohol  level on presentation to the ED was 203 and then 77 when evaluated. Urine drug screen positive for cannabis. Acetaminophen and salicylate  levels below threshold. CBC shows a red blood cell count at 6.15,  hemoglobin 18.8, hematocrit at 54.3, otherwise within normal limits. COVID-19 negative. FORMULATION:  This is a domiciled, never , employed  68-year-old with a self-reported history of depression, anxiety, OCD,  autism, and alcohol use who's mother brought him to [facility]  Emergency Department because of increasing depression and thoughts of  Suicide. He was admitted for stabilization and treatment. DIAGNOSES:  1.  Major depressive disorder, recurrent, severe, without psychotic  features. 2.  Self-reported history of anxiety, OCD, and autism. PLAN:  1. Admit to inpatient psychiatry for stabilization and treatment.   2.  Ordee q.15-minute checks for safety, programming, and p.r.n.  medication for anxiety, agitation, and insomnia. Discussed treatment  options at length including other categories of antidepressant. The  patient agreed to think about it, but declined to start a new medicine  today. 3.  Internal Medicine consult for admission. 4.  Collateral information if available for diagnostic clarification and  care coordination. Estimated length of stay 4 to 6 days for stabilization. The patient  was admitted on a statement of belief, and placed on a hold to  facilitate further observation. A total of 70 minutes was spent with the patient in completing this  evaluation and more than 50% of the time was spent in completing this  evaluation, providing counseling, and planning treatment with the  patient.         Ernesto [place] MD    D: 01/04/2022 10:48:31       T: 01/04/2022 10:52:45     [email or web address]  Job#: [id number]     Doc#: [id number]    CC:

## 2022-01-04 NOTE — BH NOTE
Purposeful Rounding    Patient Location: Nurses station    Patient willing to engage in conversation: Yes    Presentation/behavior: Anxious    Affect: Anxious    Concerns reported:  Anxiety and Tiredness    PRN medications given: Hydroxyzine and Trazodone    Environmental assessment: Room free from clutter, Clear path to bathroom  and Adequate lighting    Fall prevention interventions in place: Lighting appropriate, Room free of clutter and Clear path to bathroom    Daily Jones Fall Risk Score: 81    Daily Flaherty Fall Risk Score: 0      Electronically signed by Nova Loredo RN on 1/4/22 at 12:08 AM EST

## 2022-01-04 NOTE — H&P
Hospital Medicine History & Physical      PCP: Ethan Page DO    Date of Admission: 1/3/2022    Date of Service: Pt seen/examined on 1/4/2022      Chief Complaint:  No chief complaint on file. History Of Present Illness: The patient is a 25 y.o. male who presented to [address] [place] [address] for depression with SI. Patient was seen and evaluated in the ED by the ED medical provider, patient was medically cleared for admission to [facility] at [facility]. This note serves as an admission medical H&P. Tobacco use: Intermittent use  ETOH use: Varies: States he may binge drink a few times a month up to a handle of liquor or he may drink daily dependent on what he has available to him. Illicit drug use: THC, remote use of recreational Adderall    Patient denies any medical complaints     Past Medical History:        Diagnosis Date    OCD (obsessive compulsive disorder)        Past Surgical History:        Procedure Laterality Date    TONSILLECTOMY AND ADENOIDECTOMY  2009       Medications Prior to Admission:    Prior to Admission medications    Not on File       Allergies:  [place] flavor    Social History:  The patient currently lives with parents    TOBACCO:   reports that he has never smoked. He has never used smokeless tobacco.  ETOH:   reports current alcohol use. Family History:   Positive as follows:    No family history on file.     REVIEW OF SYSTEMS:       Constitutional: Negative for fever   HENT: Negative for sore throat   Eyes: Negative for redness   Respiratory: Negative  for dyspnea, cough   Cardiovascular: Negative for chest pain   Gastrointestinal: Negative for vomiting, diarrhea   Genitourinary: Negative for hematuria   Musculoskeletal: Negative for arthralgias   Skin: Negative for rash   Neurological: Negative for syncope    Hematological: Negative for easy bruising/bleeding   Psychiatric/Behavorial: Per psychiatry team evaluation     PHYSICAL EXAM:    /78   Pulse 79   Temp 98.4 °F (36.9 °C) (Temporal)   Resp 16   SpO2 99%     Gen: No distress. Alert. Eyes: PERRL. No sclera icterus. No conjunctival injection. ENT: No discharge. Pharynx clear. Neck: No JVD. Trachea midline. Resp: No accessory muscle use. No crackles. No wheezes. No rhonchi. CV: Regular rate. Regular rhythm. No murmur. No rub. No edema. GI: Non-tender. Non-distended. Normal bowel sounds. Skin: Warm and dry. No nodule on exposed extremities. No rash on exposed extremities. M/S: No cyanosis. No joint deformity. No clubbing. Neuro: Awake. No focal neurologic deficit on exam.  Cranial nerves II through XII intact. Patient is able to ambulate without difficulty. Psych: Per psychiatry team evaluation     CBC:   Recent Labs     01/02/22  2320   WBC 8.0   HGB 18.8*   HCT 54.3*   MCV 88.3        BMP:   Recent Labs     01/02/22  2320      K 3.6      CO2 20*   BUN 6*   CREATININE 0.8*     LIVER PROFILE:   Recent Labs     01/02/22  2320   AST 23   ALT 30   BILITOT 0.4   ALKPHOS 116     UA:  Recent Labs     01/03/22  0053   PHUR 5.0     Results for Pepper Mott (MRN [id number]) as of 1/4/2022 08:45   Ref. Range 1/3/2022 00:53   Amphetamine Screen, Urine Latest Ref Range: Negative <1000ng/mL  Neg   Benzodiazepine Screen, Urine Latest Ref Range: Negative <200 ng/mL  Neg   Cocaine Metabolite Screen, Urine Latest Ref Range: Negative <300 ng/mL  Neg   Methadone Screen, Urine Latest Ref Range: Negative <300 ng/mL  Neg   Opiate Scrn, Ur Latest Ref Range: Negative <300 ng/mL  Neg   Oxycodone Urine Latest Ref Range: Negative <100 ng/ml  Neg   PCP Screen, Urine Latest Ref Range: Negative <25 ng/mL  Neg   Cannabinoid Scrn, Ur Latest Ref Range: Negative <50 ng/mL  POSITIVE (A)     CULTURES  Results for Pepper Mott (MRN [id number]) as of 1/4/2022 08:45   Ref.  Range 1/3/2022 02:33   SARS-CoV-2, NAAT Latest Ref Range: Not Detected  Not Detected     RADIOLOGY  No orders to display ASSESSMENT/PLAN:  #Depression with SI  - per psychiatry team    #Tachycardia  -124 on admission  -Patient states no cardiac history, likely related to anxiety  -Monitor    #Alcohol abuse  -Recommend cessation  -No signs and symptoms of withdrawal  -Labs and vitals stable    #Marijuana use  -Recommend cessation    #Tobacco use  -Recommend cessation    Nyasia Spain PA-C  1/4/2022 8:45 AM

## 2022-01-04 NOTE — BH NOTE
Purposeful Rounding    Patient Location: Patient room    Patient willing to engage in conversation: No    Presentation/behavior: Other sleeping*    Affect: Neutral/Euthymic(normal)    Concerns reported: None - sleeping    PRN medications given: None - sleeping    Environmental assessment: Room free from clutter, Clear path to bathroom  and Adequate lighting    Fall prevention interventions in place: Lighting appropriate, Room free of clutter and Clear path to bathroom    Daily Mar[place] Fall Risk Score: 81    Daily Flaherty Fall Risk Score: 0      Electronically signed by Lc Mcconnell RN on 1/4/22 at 6:08 AM EST

## 2022-01-05 LAB
ESTIMATED AVERAGE GLUCOSE: 88.2 MG/DL
HBA1C MFR BLD: 4.7 %

## 2022-01-05 PROCEDURE — 6370000000 HC RX 637 (ALT 250 FOR IP)

## 2022-01-05 PROCEDURE — 1240000000 HC EMOTIONAL WELLNESS R&B

## 2022-01-05 PROCEDURE — 99233 SBSQ HOSP IP/OBS HIGH 50: CPT | Performed by: PSYCHIATRY & NEUROLOGY

## 2022-01-05 PROCEDURE — 6370000000 HC RX 637 (ALT 250 FOR IP): Performed by: PSYCHIATRY & NEUROLOGY

## 2022-01-05 RX ORDER — VENLAFAXINE HYDROCHLORIDE 37.5 MG/1
37.5 CAPSULE, EXTENDED RELEASE ORAL
Status: DISCONTINUED | OUTPATIENT
Start: 2022-01-05 | End: 2022-01-07

## 2022-01-05 RX ADMIN — HYDROXYZINE HYDROCHLORIDE 50 MG: 10 TABLET, FILM COATED ORAL at 19:43

## 2022-01-05 RX ADMIN — VENLAFAXINE HYDROCHLORIDE 37.5 MG: 37.5 CAPSULE, EXTENDED RELEASE ORAL at 10:25

## 2022-01-05 ASSESSMENT — PAIN SCALES - GENERAL: PAINLEVEL_OUTOF10: 0

## 2022-01-05 NOTE — PLAN OF CARE
Herberth Pike has been cooperative tonight but has been anxious. Pacing the unit. Denies SI/HI/Hallucinations. Attended wrap up goal group and participated while pacing around the day room. Requested Hydroxyzine for anxiety and Trazodone for sleep.

## 2022-01-05 NOTE — BH NOTE
Purposeful Rounding    Patient Location: Day room    Patient willing to engage in conversation: Yes    Presentation/behavior: Anxious    Affect: Anxious    Concerns reported: None    PRN medications given: None    Environmental assessment: Room free from clutter, Clear path to bathroom  and Adequate lighting    Fall prevention interventions in place: Lighting appropriate, Room free of clutter and Clear path to bathroom    Daily [place] Fall Risk Score: 81    Daily Flaherty Fall Risk Score: 0      Electronically signed by Lexie Hsieh RN on 1/4/22 at 11:08 PM EST

## 2022-01-05 NOTE — GROUP NOTE
Group Therapy Note    Date: 1/5/2022    Group Start Time: 1100  Group End Time: 1200  Group Topic: Cognitive Skills    [id number] [facility]        Group Therapy Note    Attendees: 11    Group members traced their hands and wrote positive compliments on each members hand. This allowed for group members to increase their self-esteem through positive affirmations. Notes:  Ti Andino attended group for the full duration. Ti Andino completed the activity and interacted approrpiately with others in the group. Status After Intervention:  Improved    Participation Level:  Active Listener and Interactive    Participation Quality: Appropriate and Attentive      Speech:  normal      Thought Process/Content: Logical      Affective Functioning: Congruent      Mood: euthymic      Level of consciousness:  Alert      Response to Learning: Able to verbalize current knowledge/experience      Endings: None Reported    Modes of Intervention: Socialization and Activity      Discipline Responsible: Behavorial Health Tech      Signature:  LISA Fraser

## 2022-01-05 NOTE — GROUP NOTE
Group Therapy Note    Date: 1/4/2022    Group Start Time: 2040  Group End Time: 2110  Group Topic: [address] [place] [address] Bailey, RN        Group Therapy Note    Attendees: 12      Daily goals and accomplishment       Patient's Goal:  Dale Sylvester paper work    Notes:  Shirlene Millan spent paced during the entire group. He stopped walking long enough to stand and talk to the group.     Status After Intervention:  Unchanged    Participation Level: Minimal    Participation Quality: Resistant      Speech:  normal      Thought Process/Content: Logical      Affective Functioning: Congruent      Mood: dysphoric      Level of consciousness:  Alert      Response to Learning: Able to verbalize current knowledge/experience      Endings: None Reported    Modes of Intervention: Education, Support and Socialization      Discipline Responsible: Registered Nurse      Signature:  Joaquim Nguyen RN

## 2022-01-05 NOTE — PLAN OF CARE
Problem: Altered Mood, Depressive Behavior:  Goal: Able to verbalize and/or display a decrease in depressive symptoms  Description: Able to verbalize and/or display a decrease in depressive symptoms  Outcome: Ongoing  Goal: Ability to disclose and discuss suicidal ideas will improve  Description: Ability to disclose and discuss suicidal ideas will improve  Outcome: Ongoing   Pt pacing on unit most of the day. Denies all. Attends some groups. Appetite good. Started Effexor today, denies adverse effects at this time.

## 2022-01-06 PROCEDURE — 6370000000 HC RX 637 (ALT 250 FOR IP)

## 2022-01-06 PROCEDURE — 6370000000 HC RX 637 (ALT 250 FOR IP): Performed by: PSYCHIATRY & NEUROLOGY

## 2022-01-06 PROCEDURE — 1240000000 HC EMOTIONAL WELLNESS R&B

## 2022-01-06 PROCEDURE — 99233 SBSQ HOSP IP/OBS HIGH 50: CPT | Performed by: PSYCHIATRY & NEUROLOGY

## 2022-01-06 RX ORDER — HYDROXYZINE PAMOATE 50 MG/1
50 CAPSULE ORAL 3 TIMES DAILY PRN
Status: DISCONTINUED | OUTPATIENT
Start: 2022-01-06 | End: 2022-01-08 | Stop reason: HOSPADM

## 2022-01-06 RX ADMIN — HYDROXYZINE PAMOATE 50 MG: 50 CAPSULE ORAL at 21:28

## 2022-01-06 RX ADMIN — HYDROXYZINE PAMOATE 50 MG: 50 CAPSULE ORAL at 09:34

## 2022-01-06 RX ADMIN — VENLAFAXINE HYDROCHLORIDE 37.5 MG: 37.5 CAPSULE, EXTENDED RELEASE ORAL at 08:21

## 2022-01-06 RX ADMIN — ACETAMINOPHEN 650 MG: 325 TABLET ORAL at 23:05

## 2022-01-06 RX ADMIN — TRAZODONE HYDROCHLORIDE 50 MG: 50 TABLET ORAL at 21:28

## 2022-01-06 ASSESSMENT — PAIN SCALES - GENERAL
PAINLEVEL_OUTOF10: 0
PAINLEVEL_OUTOF10: 0
PAINLEVEL_OUTOF10: 2

## 2022-01-06 NOTE — CARE COORDINATION
[facility]  Treatment Team Note  Day 3    Review Date & Time: 0900  1/6/2022    Patient was not in treatment team      Status EXAM:   Status and Exam  Normal: No  Facial Expression: Flat,Expressionless  Affect: Constricted  Level of Consciousness: Alert  Mood:Normal: No  Mood: Anxious,Suspicious  Motor Activity:Normal: No  Motor Activity: Decreased  Interview Behavior: Cooperative,Evasive  Preception: [place] to Person,[place] to Time,[place] to Place,[place] to Situation  Attention:Normal: Yes  Thought Content:Normal: Yes  Hallucinations:  (pt denies but does appear to be preoccupied at times)  Delusions: No  Memory:Normal: No  Memory: Poor Recent  Insight and Judgment: No  Insight and Judgment: Poor Judgment,Poor Insight  Present Suicidal Ideation: No  Present Homicidal Ideation: No      Suicide Risk CSSR-S:  1) Within the past month, have you wished you were dead or wished you could go to sleep and not wake up? : Yes  2) Have you actually had any thoughts of killing yourself? : Yes  3) Have you been thinking about how you might kill yourself? : Yes (previously)  5) Have you started to work out or worked out the details of how to kill yourself? Do you intend to carry out this plan? : Yes  6) Have you ever done anything, started to do anything, or prepared to do anything to end your life?: No      PLAN/TREATMENT RECOMMENDATIONS UPDATE: Patient will take medication as prescribed, eat 75% of meals, attend groups, participate in milieu activities, participate in treatment team and care planning for discharge and follow up.     Patient is a Voluntary admission    Ramsey Peters RN

## 2022-01-06 NOTE — PLAN OF CARE
Problem: Altered Mood, Depressive Behavior:  Goal: Ability to disclose and discuss suicidal ideas will improve  Description: Ability to disclose and discuss suicidal ideas will improve  1/6/2022 1011 by Amol Cardona LPN  Outcome: Ongoing     Problem: Altered Mood, Depressive Behavior:  Goal: Able to verbalize support systems  Description: Able to verbalize support systems  1/6/2022 1011 by Amol Cardona LPN  Outcome: Ongoing   Gerabrilene Pop has been visible but withdrawn to self. Patient is anxious and paranoid. Patient requested and received vistaril 50mg po for c/o anxiety. Patient expresses feeling anxious and believes people are talking about him and want to hurt him. Patient ask writer to let him see and open all medication packages in front of him and states he will only drink water and from on tap that he can get by himself. Writer ask patient if he felt safe here and patient responded \"yes\" writer ask patient if he was having any thoughts of self harm, SI or HI and patient responded \"No... I just feel like there is people who are talking about me. . I just don't know who\". Will continue to monitor for safety.

## 2022-01-06 NOTE — BH NOTE
Purposeful Rounding    Patient Location: Day room    Patient willing to engage in conversation: Yes    Presentation/behavior: Anxious and Other pacing*    Affect: Flat/blunted    Concerns reported:  Anxiety    PRN medications given: Hydroxyzine    Environmental assessment: Room free from clutter, Clear path to bathroom  and Adequate lighting    Fall prevention interventions in place: Lighting appropriate, Room free of clutter and Clear path to bathroom    Daily Marlin Fall Risk Score: 81    Daily Flaherty Fall Risk Score: 0      Electronically signed by Boby Mar RN on 1/6/22 at 12:10 AM EST

## 2022-01-06 NOTE — PROGRESS NOTES
Department of Psychiatry  Progress Note    Patient's chart was reviewed. Discussed with treatment team. Met with patient. SUBJECTIVE:      Continues to feel down and despondent; however less SI. He had telephone conversations with his [place] friend and his mother yesterday. Both went well. \"He is the only one who hasn't abandoned my yet. \"    Discussed treatment options again. He's taken most SSRIs and has not found them  helpful, so we discussed SNRIs.  After much discussing, agreed to an Effexor trial.    No signs of etoh withdrawal.     ROS:   Patient has new complaints: no  Sleeping adequately:  Yes   Appetite adequate: Yes  Engaged in programming: some    OBJECTIVE:  VITALS:  /78   Pulse 89   Temp 97.7 °F (36.5 °C) (Temporal)   Resp 16   Ht 5' 8\" (1.727 m)   Wt 159 lb (72.1 kg)   SpO2 98%   BMI 24.18 kg/m²     Mental Status Examination:    Appearance: fair grooming and hygiene  Behavior/Attitude toward examiner:  limited eye contact  Speech: poverty   Mood:  \"the same\"  Affect:  blunted     Thought processes:  Goal directed, linear, no CHERELLE or gross disorganization  Thought Content: less SI, no HI, no delusions voiced, no obsessions  Perceptions: no AVH  Attention: attention span and concentration were intact to interview   Abstraction: intact  Cognition:  Alert and oriented to person, place, time, and situation, recall intact  Insight: fairv  Judgment: fair    Medication:  Scheduled:   venlafaxine  37.5 mg Oral Daily with breakfast    influenza virus vaccine  0.5 mL IntraMUSCular Prior to discharge        PRN:  acetaminophen, aluminum & magnesium hydroxide-simethicone, hydrOXYzine, magnesium hydroxide, nicotine polacrilex, traZODone     FORMULATION:  This is a domiciled, never , employed  72-year-old with a self-reported history of depression, anxiety, OCD,  autism, and alcohol use who's mother brought him to [facility]  Emergency Department because of increasing depression

## 2022-01-06 NOTE — PLAN OF CARE
Abelinonaivd Diogo remains anxious on the unit. Paces a lot. Denies SI/HI. Requested Hydroxyzine for anxiety which helped. Flat affect at times. Minimizes. Did not attend wrap up goal group. No others concerns reported.

## 2022-01-06 NOTE — GROUP NOTE
Group Therapy Note    Date: 1/6/2022    Group Start Time: 7717  Group End Time: 1700  Group Topic: Healthy Living/Wellness    [address] [place], RN        Group Therapy Note    Attendees: 8      Wellness group:  Goals and timetables for goals in 2022. Discussed:    long term and short term goals     barriers to achieving goals     ways of enlisting assistance for meeting goals. Patient's Goal:  To get myself together    Notes:  Discussed his job and opportunities that may arise at his work. Status After Intervention:  Improved    Participation Level:  Active Listener and Interactive    Participation Quality: Appropriate, Attentive and Sharing      Speech:  normal      Thought Process/Content: Logical  Linear      Affective Functioning: Congruent      Mood: euthymic      Level of consciousness:  Alert and Oriented x4      Response to Learning: Able to verbalize current knowledge/experience and Able to verbalize/acknowledge new learning      Endings: None Reported    Modes of Intervention: Education and Support      Discipline Responsible: Registered Nurse      Signature:  Fauzia Flower RN

## 2022-01-06 NOTE — BH NOTE
Prn vistaril effective. Patient resting quitley in room with eyes closed. Will continue to monitor for safety.

## 2022-01-07 PROBLEM — F33.3 SEVERE EPISODE OF RECURRENT MAJOR DEPRESSIVE DISORDER, WITH PSYCHOTIC FEATURES (HCC): Status: ACTIVE | Noted: 2022-01-03

## 2022-01-07 PROCEDURE — 1240000000 HC EMOTIONAL WELLNESS R&B

## 2022-01-07 PROCEDURE — 99233 SBSQ HOSP IP/OBS HIGH 50: CPT | Performed by: PSYCHIATRY & NEUROLOGY

## 2022-01-07 PROCEDURE — 6370000000 HC RX 637 (ALT 250 FOR IP): Performed by: PSYCHIATRY & NEUROLOGY

## 2022-01-07 PROCEDURE — 6370000000 HC RX 637 (ALT 250 FOR IP)

## 2022-01-07 RX ORDER — VENLAFAXINE HYDROCHLORIDE 75 MG/1
150 CAPSULE, EXTENDED RELEASE ORAL
Status: DISCONTINUED | OUTPATIENT
Start: 2022-01-08 | End: 2022-01-08 | Stop reason: HOSPADM

## 2022-01-07 RX ORDER — VENLAFAXINE HYDROCHLORIDE 75 MG/1
75 CAPSULE, EXTENDED RELEASE ORAL
Status: COMPLETED | OUTPATIENT
Start: 2022-01-07 | End: 2022-01-07

## 2022-01-07 RX ADMIN — HYDROXYZINE PAMOATE 50 MG: 50 CAPSULE ORAL at 17:24

## 2022-01-07 RX ADMIN — VENLAFAXINE HYDROCHLORIDE 75 MG: 75 CAPSULE, EXTENDED RELEASE ORAL at 12:03

## 2022-01-07 RX ADMIN — TRAZODONE HYDROCHLORIDE 50 MG: 50 TABLET ORAL at 22:18

## 2022-01-07 NOTE — PROGRESS NOTES
Department of Psychiatry  Progress Note    Patient's chart was reviewed. Discussed with treatment team. Met with patient. SUBJECTIVE:      declined to meet with me today x 2. Said he felt he was going to have a panic attack and needed time to himself. Nursing notes suggest more significant paranoia:  Patient is anxious and paranoid. Patient requested and received vistaril 50mg po for c/o anxiety. Patient expresses feeling anxious and believes people are talking about him and want to hurt him. Patient ask writer to let him see and open all medication packages in front of him and states he will only drink water and from on tap that he can get by himself. Writer ask patient if he felt safe here and patient responded \"yes\" writer ask patient if he was having any thoughts of self harm, SI or HI and patient responded \"No... I just feel like there is people who are talking about me. . I just don't know who. \"    ROS:   Patient has new complaints: yes, see above.    Sleeping adequately:  Yes   Appetite adequate: Yes  Engaged in programming: some    OBJECTIVE:  VITALS:  /72   Pulse 99   Temp 97.7 °F (36.5 °C) (Temporal)   Resp 16   Ht 5' 8\" (1.727 m)   Wt 159 lb (72.1 kg)   SpO2 98%   BMI 24.18 kg/m²     Mental Status Examination:    Appearance: fair grooming and hygiene  Behavior/Attitude toward examiner:  limited eye contact  Speech: poverty   Mood:  \"anxious\"  Affect:  blunted     Thought processes:  Goal directed, linear, no CHERELLE or gross disorganization  Thought Content: less SI, no HI, + paranoid  Perceptions: no AVH  Attention: attention span and concentration were intact to interview   Abstraction: intact  Cognition:  Alert and oriented to person, place, time, and situation, recall intact  Insight: fair  Judgment: fair    Medication:  Scheduled:   venlafaxine  37.5 mg Oral Daily with breakfast    influenza virus vaccine  0.5 mL IntraMUSCular Prior to discharge        PRN:  hydrOXYzine, acetaminophen, aluminum & magnesium hydroxide-simethicone, magnesium hydroxide, nicotine polacrilex, traZODone     FORMULATION:  This is a domiciled, never , employed  24-year-old with a self-reported history of depression, anxiety, OCD,  autism, and alcohol use who's mother brought him to [facility] because of increasing depression and thoughts of  Suicide. He was admitted for stabilization and treatment. Principal Problem:    Severe episode of recurrent major depressive disorder, with psychotic features ([facility] [address]  Active Problems:    Cannabis use disorder, moderate, in controlled environment ([facility] [address]    Tobacco use    Alcohol use disorder, moderate, in controlled environment ([facility] [address]  Resolved Problems:    Tachycardia     PLAN:  1. Admitted to inpatient psychiatry for stabilization and treatment. 2.  On admission, ordered q.15-minute checks for safety, programming, and p.r.n.  medication for anxiety, agitation, and insomnia. Discussed treatment  options at length including other categories of antidepressant. The  patient agreed to think about it, but declined to start a new medicine. 1/5/2022 - started Effexor 37.5 daily and increase as tolerated for depression/anxiety. 3.  Internal Medicine consult for admission. #Tachycardia - resolved  -124 on admission  -Patient states no cardiac history, likely related to anxiety  -Monitor    4. Admitted on a Statement of Belief but agreed to stay voluntarily. Total time with patient was 35 minutes and more than 50 % of that time was spent counseling the patient on their symptoms, treatment, and expected goals.      Lisa Conti MD

## 2022-01-07 NOTE — GROUP NOTE
Group Therapy Note    Date: 1/7/2022    Group Start Time: 1000  Group End Time: 6633  Group Topic: Cognitive Skills    [id number] [facility]        Group Therapy Note    Attendees: 11    Group members wrote out their names and put a positive strength for each letter of their name. Notes:  Kristin Mar attended group for the full duration. Kristin Mar completed the activity and interacted appropriately with others in the group. Status After Intervention:  Improved    Participation Level:  Active Listener and Interactive    Participation Quality: Appropriate and Attentive      Speech:  normal      Thought Process/Content: Logical      Affective Functioning: Congruent      Mood: Brightened, Engaging       Level of consciousness:  Alert, Oriented x4 and Attentive      Response to Learning: Able to verbalize current knowledge/experience      Endings: None Reported    Modes of Intervention: Exploration      Discipline Responsible: Be[employer]      Signature:  LISA Calderón

## 2022-01-07 NOTE — GROUP NOTE
Group Therapy Note    Date: 1/6/2022    Group Start Time: 2030  Group End Time: 2100  Group Topic: Wrap-Up    [address] [facility]        Group Therapy Note    Attendees: Goals and importance of goal setting discussed. Night time milieu activities discussed. Patient's Goal:  Get admission paperwork done    Notes:  Successful     Status After Intervention:  Improved    Participation Level:  Active Listener and Interactive    Participation Quality: Appropriate and Attentive      Speech:  normal      Thought Process/Content: Logical  Linear      Affective Functioning: Congruent      Mood: anxious      Level of consciousness:  Alert and Oriented x4      Response to Learning: Progressing to goal      Endings: None Reported    Modes of Intervention: Support      Discipline Responsible: Flareo      Signature:  Alicia Nunez

## 2022-01-07 NOTE — PLAN OF CARE
Problem: Altered Mood, Depressive Behavior:  Goal: Able to verbalize acceptance of life and situations over which he or she has no control  Description: Able to verbalize acceptance of life and situations over which he or she has no control  Outcome: Ongoing  Goal: Able to verbalize and/or display a decrease in depressive symptoms  Description: Able to verbalize and/or display a decrease in depressive symptoms  1/7/2022 1519 by Kena Rubio RN  Outcome: Ongoing  1/7/2022 0629 by Alida Murillo RN  Outcome: Ongoing  Goal: Ability to disclose and discuss suicidal ideas will improve  Description: Ability to disclose and discuss suicidal ideas will improve  Outcome: Ongoing  Goal: Able to verbalize support systems  Description: Able to verbalize support systems  Outcome: Ongoing    Pt is alert and oriented x4. Pt was visible throughout most of the shift. Continues to appear restless and pace at times but this has improved. Pt resting in bed w/ eyes closed this afternoon. Pt appears brighter today, pleasant and cooperative w/ staff. Medication compliant. Denies SI/HI/AVH, no RTIS noted. Denies any pain at this time. Will continue to monitor.

## 2022-01-07 NOTE — PROGRESS NOTES
Pt reports experiencing increased anxiety after waking up from a nap in the afternoon. Pt rated his anxiety to be a 5/10 at this time. He stated that he is not to the point that he is paranoid but does feel as though this could be coming on if his anxiety worsens. Pt reports feeling jittery and is currently pacing the unit. Pt given vistaril @ [phone] for anxiety. He reports that this was effective. Pt is currently sitting calmly and quietly in the dayroom socializing w/ peers. Will continue to monitor.

## 2022-01-07 NOTE — PROGRESS NOTES
Department of Psychiatry  Progress Note    Patient's chart was reviewed. Discussed with treatment team. Met with patient. SUBJECTIVE:      Discussed paranoia described in staffing notes from yesterday. Patient reports he \"became delusional\" yesterday but is better today. Says he felt acutely paranoid about people and went so far as to check his food and look for cameras in his room. No-longer feels this way and recognizes it as odd. Says he feels better overall and wonders if the Effexor is already having an impact. Is active and present in the milieu today.      ROS:   Patient has new complaints: no   Sleeping adequately:  Yes   Appetite adequate: Yes  Engaged in programming: yes    OBJECTIVE:  VITALS:  /72   Pulse 99   Temp 97.7 °F (36.5 °C) (Temporal)   Resp 16   Ht 5' 8\" (1.727 m)   Wt 159 lb (72.1 kg)   SpO2 98%   BMI 24.18 kg/m²     Mental Status Examination:    Appearance: fair grooming and hygiene  Behavior/Attitude toward examiner:  improved eye contact  Speech: more communicative    Mood:  \"ok\"  Affect:  blunted     Thought processes:  Goal directed, linear, no CHERELLE or gross disorganization  Thought Content: no SI, no HI, no paranoia   Perceptions: no AVH  Attention: attention span and concentration were intact to interview   Abstraction: intact  Cognition:  Alert and oriented to person, place, time, and situation, recall intact  Insight: fair  Judgment: improving    Medication:  Scheduled:   venlafaxine  37.5 mg Oral Daily with breakfast    influenza virus vaccine  0.5 mL IntraMUSCular Prior to discharge        PRN:  hydrOXYzine, acetaminophen, aluminum & magnesium hydroxide-simethicone, magnesium hydroxide, nicotine polacrilex, traZODone     FORMULATION:  This is a domiciled, never , employed  28-year-old with a self-reported history of depression, anxiety, OCD,  autism, and alcohol use who's mother brought him to [facility] because of RN cannot approve Refill Request    RN can NOT refill this medication med is not covered by policy/route to provider. Last office visit: 9/25/2017 Neeraj Lindsey MD Last Physical: 10/24/2019 Last MTM visit: Visit date not found Last visit same specialty: 10/3/2018 Koki Reyna MD.  Next visit within 3 mo: Visit date not found  Next physical within 3 mo: Visit date not found      Fabiola Zimmerman, [facility] Triage/Med Refill 5/20/2020    Requested Prescriptions   Pending Prescriptions Disp Refills     cyclobenzaprine (FLEXERIL) 5 MG tablet [Pharmacy Med Name: CYCLOBENZAPRINE 5 MG TABLET 5 TAB] 20 tablet 5     Sig: TAKE 1 TABLET BY MOUTH TWICE A DAY       There is no refill protocol information for this order            increasing depression and thoughts of  Suicide. He was admitted for stabilization and treatment. Principal Problem:    Severe episode of recurrent major depressive disorder, with psychotic features ([address]  Active Problems:    Cannabis use disorder, moderate, in controlled environment ([address]    Tobacco use    Alcohol use disorder, moderate, in controlled environment ([address]    Major depressive disorder, recurrent severe without psychotic features ([address]  Resolved Problems:    Tachycardia     PLAN:  1. Admitted to inpatient psychiatry for stabilization and treatment. 2.  On admission, ordered q.15-minute checks for safety, programming, and p.r.n.  medication for anxiety, agitation, and insomnia. Discussed treatment  options at length including other categories of antidepressant. The  patient agreed to think about it, but declined to start a new medicine. 1/5/2022 - started Effexor 37.5 daily and increase as tolerated for depression/anxiety. 1/7/2022 - increase Effexor to 75 today and 150mg tomorrow. Order placed. 3.  Internal Medicine consult for admission. #Tachycardia - resolved  -124 on admission  -Patient states no cardiac history, likely related to anxiety    4. Admitted on a Statement of Belief but agreed to stay voluntarily. [employer] DC tomorrow (Saturday) if continues to make and maintain gains. Total time with patient was 35 minutes and more than 50 % of that time was spent counseling the patient on their symptoms, treatment, and expected goals.      Lamin Sanford MD 1

## 2022-01-07 NOTE — PLAN OF CARE
Problem: Altered Mood, Depressive Behavior:  Goal: Able to verbalize and/or display a decrease in depressive symptoms  Description: Able to verbalize and/or display a decrease in depressive symptoms  Outcome: Ongoing  Goal: Absence of self-harm  Description: Absence of self-harm  Outcome: Ongoing     Problem: Suicide risk  Goal: Provide patient with safe environment  Description: Provide patient with safe environment  Outcome: Ongoing     Patient up ad estephania. Paced the halls and watched television throughout most of evening. Pt pleasant and cooperative this shift. Pt received PRN vistaril for anxiety, PRN trazodone for sleep, and tylenol for slight generalized pain; each given at bedtime. Pt had a hard time falling asleep; pt slept throughout night once he fell asleep. Pt monitored for safety and comfort.

## 2022-01-08 VITALS
OXYGEN SATURATION: 98 % | WEIGHT: 159 LBS | RESPIRATION RATE: 16 BRPM | HEART RATE: 95 BPM | HEIGHT: 68 IN | SYSTOLIC BLOOD PRESSURE: 130 MMHG | TEMPERATURE: 98.1 F | BODY MASS INDEX: 24.1 KG/M2 | DIASTOLIC BLOOD PRESSURE: 78 MMHG

## 2022-01-08 PROCEDURE — G0008 ADMIN INFLUENZA VIRUS VAC: HCPCS | Performed by: PSYCHIATRY & NEUROLOGY

## 2022-01-08 PROCEDURE — 6360000002 HC RX W HCPCS: Performed by: PSYCHIATRY & NEUROLOGY

## 2022-01-08 PROCEDURE — 99239 HOSP IP/OBS DSCHRG MGMT >30: CPT | Performed by: PSYCHIATRY & NEUROLOGY

## 2022-01-08 PROCEDURE — 90686 IIV4 VACC NO PRSV 0.5 ML IM: CPT | Performed by: PSYCHIATRY & NEUROLOGY

## 2022-01-08 PROCEDURE — 6370000000 HC RX 637 (ALT 250 FOR IP): Performed by: PSYCHIATRY & NEUROLOGY

## 2022-01-08 PROCEDURE — 5130000000 HC BRIDGE APPOINTMENT

## 2022-01-08 RX ORDER — VENLAFAXINE HYDROCHLORIDE 150 MG/1
150 CAPSULE, EXTENDED RELEASE ORAL
Qty: 30 CAPSULE | Refills: 0 | Status: SHIPPED | OUTPATIENT
Start: 2022-01-09

## 2022-01-08 RX ADMIN — VENLAFAXINE HYDROCHLORIDE 150 MG: 75 CAPSULE, EXTENDED RELEASE ORAL at 08:30

## 2022-01-08 RX ADMIN — INFLUENZA A VIRUS A/VICTORIA/2570/2019 IVR-215 (H1N1) ANTIGEN (PROPIOLACTONE INACTIVATED), INFLUENZA A VIRUS A/CAMBODIA/E0826360/2020 IVR-224 (H3N2) ANTIGEN (PROPIOLACTONE INACTIVATED), INFLUENZA B VIRUS B/VICTORIA/705/2018 BVR-11 ANTIGEN (PROPIOLACTONE INACTIVATED), INFLUENZA B VIRUS B/PHUKET/3073/2013 BVR-1B ANTIGEN (PROPIOLACTONE INACTIVATED) 0.5 ML: 15; 15; 15; 15 INJECTION, SUSPENSION INTRAMUSCULAR at 11:26

## 2022-01-08 ASSESSMENT — PAIN SCALES - GENERAL: PAINLEVEL_OUTOF10: 0

## 2022-01-08 NOTE — PLAN OF CARE
PT A&O, VISIBLE ON UNIT, CALM AND COOPERATIVE WITH CARE DENIES SI,HI,AVH, no distress noted at this time.

## 2022-01-08 NOTE — PLAN OF CARE
Problem: Altered Mood, Depressive Behavior:  Goal: Ability to disclose and discuss suicidal ideas will improve  Description: Ability to disclose and discuss suicidal ideas will improve  Outcome: Met This Shift     Problem: Altered Mood, Depressive Behavior:  Goal: Able to verbalize acceptance of life and situations over which he or she has no control  Description: Able to verbalize acceptance of life and situations over which he or she has no control  Outcome: Ongoing  Goal: Absence of self-harm  Description: Absence of self-harm  Outcome: Ongoing     Pt up ad estephania. More social this shift with peers. Pt's BP was initially elevated slightly, pt was anxious and preoccupied with BP. This writer educated on BP readings. BP rechecked prior to pt going to bed and was within normal limits. Pt reports the effexor making him feel hyped up and jittery with a reduced appetite. Pt educated on side effects of medication and to be sure to let staff know if he felt uncomfortable with the side effects; pt reported that he liked the feeling of being stimulated. Pt received PRN trazodone at 2218; medication effective, patient slept throughout the night. Monitored for safety and comfort.

## 2022-01-08 NOTE — GROUP NOTE
Group Therapy Note    Date: 1/8/2022    Group Start Time: 1:00 PM  Group End Time: 2:00 PM  Group Topic: Recreational    [address] [facility]        Group Therapy Note    Attendees: 11       Patient's Goal:  Participate in discussion about how to use assertive communication. Pt will participate in assertive communication role play. Notes: Pt demonstrated new learnings, was appropriate and pleasant. Status After Intervention:  Improved    Participation Level:  Active Listener and Interactive    Participation Quality: Appropriate, Attentive, Sharing and Supportive      Speech:  normal      Thought Process/Content: Logical      Affective Functioning: Flat      Mood: euthymic      Level of consciousness:  Alert, Oriented x4 and Attentive      Response to Learning: Able to verbalize current knowledge/experience, Able to verbalize/acknowledge new learning, Able to retain information and Progressing to goal      Endings: None Reported    Modes of Intervention: Support, Socialization and Activity      Discipline Responsible: /Counselor      Signature:  LEILA Del Toro

## 2022-01-08 NOTE — PROGRESS NOTES
[facility]  Discharge Note    Pt discharged with followings belongings:   Dental Appliances: None  Vision - Corrective Lenses: None  Hearing Aid: None  Jewelry: None  Clothing: Pants,Sweater,Footwear  Were All Patient Medications Collected?: Not Applicable  Other Valuables: Cell phone   Belongings returned to patient. Patient education on aftercare instructions: yes  Information faxed to [facility] by DUSTY Gamez RN  at 1:33 PM .Patient verbalize understanding of AVS: yes. Status EXAM upon discharge:  Status and Exam  Normal: No  Facial Expression: Flat,Expressionless  Affect: Congruent  Level of Consciousness: Alert  Mood:Normal: No  Mood: Depressed  Motor Activity:Normal: Yes  Motor Activity: Increased  Interview Behavior: Cooperative  Preception: [place] to Person,[place] to Time,[place] to Place,[place] to Situation  Attention:Normal: Yes  Thought Processes: Circumstantial  Thought Content:Normal: Yes  Hallucinations: None  Delusions: No  Memory:Normal: Yes  Memory: Poor Recent  Insight and Judgment: No  Insight and Judgment: Poor Judgment,Poor Insight  Present Suicidal Ideation: No  Present Homicidal Ideation: No      Metabolic Screening:    Lab Results   Component Value Date    LABA1C 4.7 01/03/2022       Lab Results   Component Value Date    CHOL 191 01/03/2022     Lab Results   Component Value Date    TRIG 67 01/03/2022     Lab Results   Component Value Date    HDL 58 01/03/2022     No components found for: [facility]  Lab Results   Component Value Date    LABVLDL 13 01/03/2022       Cayden Pappas RN     Bridge Appointment completed: Reviewed Discharge Instructions with patient. Patient verbalizes understanding and agreement with the discharge plan using the teachback method.      Referral for Outpatient Tobacco Cessation Counseling, upon discharge (kade X if applicable and completed):    ( )  Hospital staff assisted patient to call Quit Line or faxed referral                                   during hospitalization                  ( )  Recognizing danger situations (included triggers and roadblocks), if not completed on admission                    ( )  Coping skills (new ways to manage stress, exercise, relaxation techniques, changing routine, distraction), if not completed on admission                                                           ( )  Basic information about quitting (benefits of quitting, techniques in how to quit, available resources, if not completed on admission  ( ) Referral for counseling faxed to [facility]   ( ) Patient refused referral  ( x) Patient refused counseling  ( ) Patient refused smoking cessation medication upon discharge    Vaccinations (kade X if applicable and completed):  ( ) Patient states already received influenza vaccine elsewhere  ( ) Patient received influenza vaccine during this hospitalization  ( x) Patient refused influenza vaccine at this time

## 2022-01-08 NOTE — GROUP NOTE
Group Therapy Note    Date: 1/8/2022    Group Start Time: 10:00 AM  Group End Time: 10:30 AM  Group Topic: [address], [place]        Group Therapy Note    Attendees: 10       Patient's Goal: Clean room and get ready for d/c. Notes: Pt was appropriate and participated in group discussion. Status After Intervention:  Improved    Participation Level:  Active Listener and Interactive    Participation Quality: Appropriate, Attentive, Sharing and Supportive      Speech:  normal      Thought Process/Content: Logical, linear      Affective Functioning: Congruent      Mood: euthymic      Level of consciousness:  Alert, Oriented x4 and Attentive      Response to Learning: Able to verbalize current knowledge/experience and Progressing to goal      Endings: None Reported    Modes of Intervention: Socialization      Discipline Responsible: /Counselor      Signature:  LEILA Del Toro

## 2022-01-08 NOTE — GROUP NOTE
Group Therapy Note    Date: 1/7/2022    Group Start Time: 2030  Group End Time: 2055  Group Topic: Wrap-Up    [address] [facility]        Group Therapy Note    Attendees: Goals and importance of goal setting discussed. Night time milieu activities discussed. Patient's Goal:  Talk to dr about adjusting meds    Notes:  Successful     Status After Intervention:  Improved    Participation Level:  Active Listener and Interactive    Participation Quality: Appropriate and Attentive      Speech:  normal      Thought Process/Content: Logical  Linear      Affective Functioning: Congruent      Mood: anxious      Level of consciousness:  Alert and Oriented x4      Response to Learning: Progressing to goal      Endings: None Reported    Modes of Intervention: Support      Discipline Responsible: [employer]      Signature:  Sarah Beth Dwyer

## 2022-01-10 NOTE — DISCHARGE SUMMARY
Discharge Summary   Admit Date: 1/3/2022   Discharge Date:  1/8/2022      Spent over 40 minutes with patient and staff on [facility]   Final Dx:   DIAGNOSES:  1.  Major depressive disorder, recurrent, severe, without psychotic  features. 2.  Self-reported history of anxiety, OCD, and autism. Condition on DC  Mood and affect are stable and pt is not suicidal   VITALS:  /78   Pulse 95   Temp 98.1 °F (36.7 °C) (Temporal)   Resp 16   Ht 5' 8\" (1.727 m)   Wt 159 lb (72.1 kg)   SpO2 98%   BMI 24.18 kg/m²   Brief Summary Present Illness   The patient reports over the last couple of  months he has developed worsening symptoms of depression including low  mood, anhedonia, fatigue, decreased concentration, excessive guilt,  self-reproach, and increasing thoughts of suicide. He says he has had  suicide impulses off and on for a couple of weeks, but it became more intense  recently. He began thinking of overdosing or shooting himself with a  gun. Because of this, his mother brought him to [facility] for assessment. Hospital Course Pt was admitted for worsening depression and si with plan to shoort self. This is pt of Dr Elsie Suarez that was discharge on my on call weekend. Pt reported improvements of his sx's and was out in milieu and social with staff. Per plan of primary provider On admission, ordered q.15-minute checks for safety, programming, and p.r.n.  medication for anxiety, agitation, and insomnia.  Discussed treatment  options at length including other categories of antidepressant.  The  patient agreed to think about it, but declined to start a new medicine.     1/5/2022 - started Effexor 37.5 daily and increase as tolerated for depression/anxiety. 1/7/2022 - increase Effexor to 75 today and 150mg tomorrow. Order placed. Patient stabilized on meds and milieu treatment. Patient was discharged to home to continue recovery in the community.    PE: (reviewed) and labs (see medical H&PE)  Labs:    Admission on 01/03/2022, Discharged on 01/08/2022   Component Date Value Ref Range Status    TSH 01/03/2022 2.24  0.43 - 4.00 uIU/mL Final    Hemoglobin A1C 01/03/2022 4.7  See comment % Final    Comment: Comment:  Diagnosis of Diabetes: > or = 6.5%  Increased risk of diabetes (Prediabetes): 5.7-6.4%  Glycemic Control: Nonpregnant Adults: <7.0%                    Pregnant: <6.0%        eAG 01/03/2022 88.2  mg/dL Final    Cholesterol, Total 01/03/2022 191  0 - 199 mg/dL Final    Triglycerides 01/03/2022 67  0 - 150 mg/dL Final    HDL 01/03/2022 58  40 - 60 mg/dL Final    LDL Calculated 01/03/2022 120* <100 mg/dL Final    VLDL Cholesterol Calculated 01/03/2022 13  Not Established mg/dL Final        Mental Status Exam at Discharge:  Level of consciousness:  awake  Appearance:  well-appearing, in chair, good grooming and good hygiene well-developed, well-nourished  Behavior/Motor:  no abnormalities noted normal gait and station AIMS: 0  Attitude toward examiner:  cooperative, attentive and good eye contact  Speech:  spontaneous, normal rate, normal volume and well articulated  Mood:  dysthymic  Affect:  mood congruent Anxiety: mild  Hallucinations: Absent  Thought processes:  coherent Attention span, Concentration & Attention:  attention span and concentration were age appropriate  Thought content:  Reality based no evidence of delusions OCD: none    Insight: normal insight and judgment Cognition:  oriented to person, place, and time  Fund of Knowledge: average  IQ:average Memory: intact  Suicide:  No specific plan to harm self  Sleep: sleeps through the night  Appetite: ok   Reassess Agatha Risk:  no specific plan to harm self Pt has phone numbers to contact if suicidal thoughts recur and states pt will return to the hospital if suicidal feelings return.    Hospital Routine Meds:     Hospital PRN Meds:    Discharge Meds:    Discharge Medication List as of 1/8/2022 11:33 AM           Details venlafaxine (EFFEXOR XR) 150 MG extended release capsule Take 1 capsule by mouth daily (with breakfast), Disp-30 capsule, R-0Normal                   Disposition - Residence      Follow Up:  See Discharge Instructions